# Patient Record
Sex: FEMALE | Race: WHITE | NOT HISPANIC OR LATINO
[De-identification: names, ages, dates, MRNs, and addresses within clinical notes are randomized per-mention and may not be internally consistent; named-entity substitution may affect disease eponyms.]

---

## 2022-11-05 ENCOUNTER — NON-APPOINTMENT (OUTPATIENT)
Age: 57
End: 2022-11-05

## 2022-11-21 PROBLEM — Z00.00 ENCOUNTER FOR PREVENTIVE HEALTH EXAMINATION: Status: ACTIVE | Noted: 2022-11-21

## 2022-11-28 ENCOUNTER — APPOINTMENT (OUTPATIENT)
Dept: PULMONOLOGY | Facility: CLINIC | Age: 57
End: 2022-11-28

## 2022-11-28 VITALS
HEIGHT: 56 IN | OXYGEN SATURATION: 97 % | TEMPERATURE: 98 F | HEART RATE: 82 BPM | BODY MASS INDEX: 47.69 KG/M2 | DIASTOLIC BLOOD PRESSURE: 62 MMHG | SYSTOLIC BLOOD PRESSURE: 115 MMHG | WEIGHT: 212 LBS

## 2022-11-28 DIAGNOSIS — Z82.49 FAMILY HISTORY OF ISCHEMIC HEART DISEASE AND OTHER DISEASES OF THE CIRCULATORY SYSTEM: ICD-10-CM

## 2022-11-28 DIAGNOSIS — Z87.19 PERSONAL HISTORY OF OTHER DISEASES OF THE DIGESTIVE SYSTEM: ICD-10-CM

## 2022-11-28 DIAGNOSIS — Z86.79 PERSONAL HISTORY OF OTHER DISEASES OF THE CIRCULATORY SYSTEM: ICD-10-CM

## 2022-11-28 DIAGNOSIS — I10 ESSENTIAL (PRIMARY) HYPERTENSION: ICD-10-CM

## 2022-11-28 DIAGNOSIS — Z86.39 PERSONAL HISTORY OF OTHER ENDOCRINE, NUTRITIONAL AND METABOLIC DISEASE: ICD-10-CM

## 2022-11-28 PROCEDURE — 94729 DIFFUSING CAPACITY: CPT

## 2022-11-28 PROCEDURE — 99204 OFFICE O/P NEW MOD 45 MIN: CPT | Mod: 25

## 2022-11-28 PROCEDURE — 94727 GAS DIL/WSHOT DETER LNG VOL: CPT

## 2022-11-28 PROCEDURE — 94010 BREATHING CAPACITY TEST: CPT

## 2022-11-28 RX ORDER — METOPROLOL SUCCINATE 25 MG/1
25 TABLET, EXTENDED RELEASE ORAL
Qty: 30 | Refills: 0 | Status: ACTIVE | COMMUNITY
Start: 2022-02-14

## 2022-11-28 RX ORDER — FLUTICASONE PROPIONATE 50 MCG
50 SPRAY, SUSPENSION NASAL
Refills: 0 | Status: ACTIVE | COMMUNITY

## 2022-11-28 RX ORDER — LOSARTAN POTASSIUM 100 MG/1
100 TABLET, FILM COATED ORAL
Qty: 30 | Refills: 0 | Status: ACTIVE | COMMUNITY
Start: 2022-02-14

## 2022-11-28 RX ORDER — EZETIMIBE 10 MG/1
10 TABLET ORAL
Qty: 30 | Refills: 0 | Status: ACTIVE | COMMUNITY
Start: 2022-08-09

## 2022-11-28 RX ORDER — ATORVASTATIN CALCIUM 80 MG/1
80 TABLET, FILM COATED ORAL
Qty: 30 | Refills: 0 | Status: ACTIVE | COMMUNITY
Start: 2022-08-03

## 2022-11-28 RX ORDER — MELOXICAM 15 MG/1
15 TABLET ORAL
Qty: 30 | Refills: 0 | Status: ACTIVE | COMMUNITY
Start: 2022-05-10

## 2022-11-28 NOTE — ASSESSMENT
[FreeTextEntry1] : 11/28/22  MRs Carlson  is a 57 year with morbid obesity and has decided with her PcP and her bariatric surgeon to proceed with  surgery.Her PFTs are normal and she offers no excessive resp sx.  She has known Sleep apnea but has not used CPAP for yearts and her Sleep study was many years ago  There is no contraindication for the proposed partial gastrectomy under general anesthesia. There are on increased pulmonary risk factors beyond the known obesity. However she will need a reevaluation timt of her sleep apnea and a Home sleep study has been ordered  Time 45min  counseling, education, documentation, imaging reviewed, medication reviewed, inhaler demonstrated, old records reviewed, HX and PE

## 2022-11-28 NOTE — HISTORY OF PRESENT ILLNESS
[Never] : never [Former] : former [TextBox_4] : pre diabetes  2 yrs, HBP  28yrs,     incr  cholesrol 5yrs  [TextBox_11] : 0.5 [TextBox_13] : 10 [YearQuit] : 2012

## 2022-11-28 NOTE — REASON FOR VISIT
[Follow-Up] : a follow-up visit [Initial] : an initial visit [Cough] : cough [Shortness of Breath] : shortness of breath [TextBox_44] : pt needs medical clearance for bariatric procedure. COmplains of a cough due to GERD and SOB due to hiatal hernia.

## 2022-11-28 NOTE — REVIEW OF SYSTEMS
[Fatigue] : fatigue [Back Pain] : back pain [Numbness] : numbness [Recent Wt Gain (___ Lbs)] : ~T no recent weight gain [SOB on Exertion] : sob on exertion [Chest Discomfort] : no chest discomfort [Seasonal Allergies] : no seasonal allergies [Hepatic Disease] : no hepatic disease [Arthralgias] : arthralgias [Obesity] : obesity [Negative] : Neurologic [GERD] : no gerd [Diarrhea] : no diarrhea [Constipation] : no constipation [Dysphagia] : no dysphagia [Anemia] : no anemia [Blood Transfusion] : no blood transfusion [Headache] : no headache [Dizziness] : no dizziness [Memory Loss] : no memory loss [Tremor] : no tremor [Depression] : no depression [Anxiety] : no anxiety [Panic Attacks] : no panic attacks [Diabetes] : no diabetes [Thyroid Problem] : no thyroid problem [TextBox_3] : snores loudly,  [TextBox_30] : hiatal, not usually SOB   [TextBox_44] : coronary artery disease  and 30% one vessel  [TextBox_57] : winter sx and  bronchial wheeze  [TextBox_94] : bulging and herniated

## 2022-12-20 ENCOUNTER — OUTPATIENT (OUTPATIENT)
Dept: OUTPATIENT SERVICES | Facility: HOSPITAL | Age: 57
LOS: 1 days | End: 2022-12-20
Payer: COMMERCIAL

## 2022-12-20 DIAGNOSIS — G47.33 OBSTRUCTIVE SLEEP APNEA (ADULT) (PEDIATRIC): ICD-10-CM

## 2022-12-20 PROCEDURE — 95800 SLP STDY UNATTENDED: CPT

## 2023-01-11 ENCOUNTER — APPOINTMENT (OUTPATIENT)
Dept: PULMONOLOGY | Facility: CLINIC | Age: 58
End: 2023-01-11
Payer: COMMERCIAL

## 2023-01-11 VITALS
OXYGEN SATURATION: 96 % | BODY MASS INDEX: 47.69 KG/M2 | HEART RATE: 72 BPM | TEMPERATURE: 98.2 F | WEIGHT: 212 LBS | SYSTOLIC BLOOD PRESSURE: 122 MMHG | DIASTOLIC BLOOD PRESSURE: 80 MMHG | HEIGHT: 56 IN

## 2023-01-11 DIAGNOSIS — E66.01 MORBID (SEVERE) OBESITY DUE TO EXCESS CALORIES: ICD-10-CM

## 2023-01-11 PROCEDURE — 99213 OFFICE O/P EST LOW 20 MIN: CPT

## 2023-01-12 PROBLEM — E66.01 MORBID (SEVERE) OBESITY DUE TO EXCESS CALORIES: Status: ACTIVE | Noted: 2022-11-28

## 2023-01-12 NOTE — PHYSICAL EXAM
[No Acute Distress] : no acute distress [Normal Oropharynx] : normal oropharynx [Normal Appearance] : normal appearance [No Neck Mass] : no neck mass [Normal Rate/Rhythm] : normal rate/rhythm [Normal S1, S2] : normal s1, s2 [No Murmurs] : no murmurs [No Resp Distress] : no resp distress [Clear to Auscultation Bilaterally] : clear to auscultation bilaterally [No Abnormalities] : no abnormalities [Benign] : benign [Normal Gait] : normal gait [No Clubbing] : no clubbing [No Cyanosis] : no cyanosis [No Edema] : no edema [FROM] : FROM [Normal Color/ Pigmentation] : normal color/ pigmentation [No Focal Deficits] : no focal deficits [Oriented x3] : oriented x3 [Normal Affect] : normal affect [TextBox_2] : morbid obese  BMI  47

## 2023-01-12 NOTE — HISTORY OF PRESENT ILLNESS
[Obstructive Sleep Apnea] : obstructive sleep apnea [TextBox_4] : 11/28/22 MRs Haley is a 57 year with morbid obesity and has decided with her PcP and her bariatric surgeon to proceed with  surgery.. The patient is concerned about her health issues that are developing from her morbid obesity  She has HBP, sleep apnea, early coronary, hyper lipidemia artery calcification in one blood vessel , early arthritis and there is other family risk factors  The patient had been dxed with Sleep apnea several years ( > 5yrs) but has not used her CPAP as it was not tolerated  \par \par 1/11/2023 the patient returns for final evaluation prior to bariatric surgery.  The patient had a sleep study that revealed she had moderate sleep apnea with a AHI of 17.  This is chest in the lower level of the moderate range.  Her PFTs also at the lower limits of normal.

## 2023-01-12 NOTE — REASON FOR VISIT
[Follow-Up] : a follow-up visit [TextBox_44] : 2 month,No symptoms, sleep study results 12/20/22 for bariatric surgery clearance.

## 2023-01-12 NOTE — ASSESSMENT
[FreeTextEntry1] : 11/28/22  MRs Carlson  is a 57 year with morbid obesity and has decided with her PcP and her bariatric surgeon to proceed with  surgery.Her PFTs are normal and she offers no excessive resp sx.  She has known Sleep apnea but has not used CPAP for yearts and her Sleep study was many years ago  There is no contraindication for the proposed partial gastrectomy under general anesthesia. There are on increased Pulmonary risk factors beyond the known obesity. However she will need a reevaluation timt of her sleep apnea and a Home sleep study has been ordered  Time 45min  counseling, education, documentation, imaging reviewed, medication reviewed, inhaler demonstrated, old records reviewed, HX and PE \par \par 1/11/2023  MR/Mrs Robyn armendariz is a 57 year with morbid obesity and has decided with her PcP and her bariatric surgeon to proceed with  surgery The bariatric surgery will be a partial gastrectomy under general anesthesia, commonly known as the "sleeve" operation.  Patient has no increased pulmonary risk for the above noted surgery and general anesthesia.  The patient has moderate sleep apnea and appropriate sleep apnea precautions should be taken.  Time spent 30 minutes counseling, education, documentation, imaging reviewed, medication reviewed, inhaler demonstrated, old records reviewed, HX and PE\par \par \par \par

## 2023-01-12 NOTE — REVIEW OF SYSTEMS
[Recent Wt Gain (___ Lbs)] : ~T no recent weight gain [SOB on Exertion] : sob on exertion [Chest Discomfort] : no chest discomfort [Seasonal Allergies] : no seasonal allergies [GERD] : no gerd [Diarrhea] : no diarrhea [Constipation] : no constipation [Dysphagia] : no dysphagia [Hepatic Disease] : no hepatic disease [Arthralgias] : arthralgias [Anemia] : no anemia [Blood Transfusion] : no blood transfusion [Depression] : no depression [Panic Attacks] : no panic attacks [Diabetes] : no diabetes [Thyroid Problem] : no thyroid problem [Obesity] : obesity [Negative] : Neurologic [TextBox_3] : BmI 47   62 inches and 225lbs

## 2023-01-25 ENCOUNTER — APPOINTMENT (OUTPATIENT)
Dept: PULMONOLOGY | Facility: CLINIC | Age: 58
End: 2023-01-25
Payer: COMMERCIAL

## 2023-01-25 VITALS
DIASTOLIC BLOOD PRESSURE: 82 MMHG | OXYGEN SATURATION: 96 % | BODY MASS INDEX: 47.69 KG/M2 | WEIGHT: 212 LBS | RESPIRATION RATE: 16 BRPM | HEART RATE: 87 BPM | SYSTOLIC BLOOD PRESSURE: 130 MMHG | HEIGHT: 56 IN

## 2023-01-25 PROCEDURE — 99214 OFFICE O/P EST MOD 30 MIN: CPT

## 2023-01-25 NOTE — PROCEDURE
[FreeTextEntry1] : 11/28/2022: Pulmonary function test [facility]-normal

## 2023-01-25 NOTE — DISCUSSION/SUMMARY
[None] : There are no changes in medication management [Alcohol Avoidance] : alcohol avoidance [Sedative Avoidance] : sedative avoidance [Weight Loss Program] : weight loss program [de-identified] : Moderate obstructive sleep apnea severe in rem [de-identified] : CPAP initiation.  AutoSet at a range of 4-16 with a nasal mask.  Compliance was outlined to the patient including proper care and maintenance of CPAP

## 2023-01-25 NOTE — HISTORY OF PRESENT ILLNESS
[Home] : home [Obstructive Sleep Apnea] : obstructive sleep apnea [Awakes Unrefreshed] : awakes unrefreshed [Daytime Somnolence] : daytime somnolence [Snoring] : snoring [Witnessed Apneas] : witnessed apneas [Awakes with Dry Mouth] : does not awaken with dry mouth [Awakes with Headache] : does not awaken with headache [TextBox_77] : 2100 [TextBox_79] : 2609 [TextBox_83] : 0 [TextBox_89] : 4 [TextBox_100] : 12/20/2022 [TextBox_108] : 17.0 [TextBox_116] : 72.0 [TextBox_120] : REM index 37.8 [ESS] : 8

## 2023-06-13 ENCOUNTER — APPOINTMENT (OUTPATIENT)
Dept: PULMONOLOGY | Facility: CLINIC | Age: 58
End: 2023-06-13
Payer: COMMERCIAL

## 2023-06-13 VITALS — HEIGHT: 56 IN | WEIGHT: 207 LBS | BODY MASS INDEX: 46.56 KG/M2

## 2023-06-13 VITALS — HEART RATE: 72 BPM | OXYGEN SATURATION: 96 % | DIASTOLIC BLOOD PRESSURE: 74 MMHG | SYSTOLIC BLOOD PRESSURE: 112 MMHG

## 2023-06-13 DIAGNOSIS — Z71.89 OTHER SPECIFIED COUNSELING: ICD-10-CM

## 2023-06-13 DIAGNOSIS — Z01.811 ENCOUNTER FOR PREPROCEDURAL RESPIRATORY EXAMINATION: ICD-10-CM

## 2023-06-13 DIAGNOSIS — G47.33 OBSTRUCTIVE SLEEP APNEA (ADULT) (PEDIATRIC): ICD-10-CM

## 2023-06-13 PROCEDURE — 94010 BREATHING CAPACITY TEST: CPT

## 2023-06-13 PROCEDURE — 99214 OFFICE O/P EST MOD 30 MIN: CPT | Mod: 25

## 2023-06-13 NOTE — CONSULT LETTER
[Dear  ___] : Dear  [unfilled], [Consult Letter:] : I had the pleasure of evaluating your patient, [unfilled]. [Please see my note below.] : Please see my note below. [Consult Closing:] : Thank you very much for allowing me to participate in the care of this patient.  If you have any questions, please do not hesitate to contact me. [Sincerely,] : Sincerely, [FreeTextEntry3] : Priyank Kelly MD FCCP\par Pulmonary/Critical Care/Sleep Medicine\par Department of Internal Medicine\par \par [facility] [employer]\par \par

## 2023-06-13 NOTE — PROCEDURE
[FreeTextEntry1] : 11/28/2022: Pulmonary function test [facility]-normal\par \par 6/13/2023: Spirometry-normal

## 2023-06-13 NOTE — DISCUSSION/SUMMARY
[None] : There are no changes in medication management [Alcohol Avoidance] : alcohol avoidance [Sedative Avoidance] : sedative avoidance [Weight Loss Program] : weight loss program [Obstructive Sleep Apnea] : obstructive sleep apnea [Moderate] : moderate in severity [Responding to Treatment] : responding to treatment [FreeTextEntry1] : No pulmonary/sleep contraindication to proposed bariatric surgery.  Patient should be provided with an empiric CPAP of 10 on recovery from anesthesia and use her own auto set device or empiric CPAP during the course of hospitalization with sleep [de-identified] : Patient compliant with CPAP/BiPAP and benefiting from therapy [de-identified] : CPAP initiation.  AutoSet at a range of 4-16 with a nasal mask.  Compliance was outlined to the patient including proper care and maintenance of CPAP

## 2023-06-13 NOTE — HISTORY OF PRESENT ILLNESS
[Obstructive Sleep Apnea] : obstructive sleep apnea [Awakes Unrefreshed] : awakes unrefreshed [Daytime Somnolence] : daytime somnolence [Snoring] : snoring [Witnessed Apneas] : witnessed apneas [Home] : home [APAP:] : APAP [Never] : never [Nasal mask] : nasal mask [TextBox_4] : No new complaints [Awakes with Dry Mouth] : does not awaken with dry mouth [Awakes with Headache] : does not awaken with headache [TextBox_77] : 2100 [TextBox_79] : 6800 [TextBox_83] : 0 [TextBox_89] : 4 [TextBox_100] : 12/20/2022 [TextBox_108] : 17.0 [TextBox_116] : 72.0 [TextBox_120] : REM index 37.8 [TextBox_125] : 4-16 [TextBox_127] : 5/14/2023 [TextBox_129] : 6/12/2023 [TextBox_133] : 97 [TextBox_137] : 97 [TextBox_141] : 7 [TextBox_143] : 0 [TextBox_147] : 1.0 [TextBox_158] : [place] [TextBox_160] : Eson 2 [TextBox_162] : 1/25/2023 [ESS] : 8
